# Patient Record
Sex: MALE | Race: ASIAN | NOT HISPANIC OR LATINO | URBAN - METROPOLITAN AREA
[De-identification: names, ages, dates, MRNs, and addresses within clinical notes are randomized per-mention and may not be internally consistent; named-entity substitution may affect disease eponyms.]

---

## 2024-01-29 ENCOUNTER — EMERGENCY (EMERGENCY)
Facility: HOSPITAL | Age: 24
LOS: 1 days | Discharge: HOME | End: 2024-01-29
Attending: STUDENT IN AN ORGANIZED HEALTH CARE EDUCATION/TRAINING PROGRAM | Admitting: STUDENT IN AN ORGANIZED HEALTH CARE EDUCATION/TRAINING PROGRAM
Payer: MEDICAID

## 2024-01-29 VITALS
DIASTOLIC BLOOD PRESSURE: 91 MMHG | OXYGEN SATURATION: 99 % | SYSTOLIC BLOOD PRESSURE: 140 MMHG | RESPIRATION RATE: 16 BRPM | TEMPERATURE: 98 F | HEART RATE: 72 BPM

## 2024-01-29 PROCEDURE — 99284 EMERGENCY DEPT VISIT MOD MDM: CPT

## 2024-01-29 RX ORDER — IBUPROFEN 200 MG
400 TABLET ORAL ONCE
Refills: 0 | Status: DISCONTINUED | OUTPATIENT
Start: 2024-01-29 | End: 2024-01-29

## 2024-01-29 RX ORDER — IBUPROFEN 200 MG
600 TABLET ORAL ONCE
Refills: 0 | Status: COMPLETED | OUTPATIENT
Start: 2024-01-29 | End: 2024-01-29

## 2024-01-29 RX ORDER — LIDOCAINE 4 G/100G
1 CREAM TOPICAL ONCE
Refills: 0 | Status: COMPLETED | OUTPATIENT
Start: 2024-01-29 | End: 2024-01-29

## 2024-01-29 RX ORDER — ACETAMINOPHEN 500 MG
975 TABLET ORAL ONCE
Refills: 0 | Status: COMPLETED | OUTPATIENT
Start: 2024-01-29 | End: 2024-01-29

## 2024-01-29 RX ADMIN — Medication 975 MILLIGRAM(S): at 09:22

## 2024-01-29 RX ADMIN — Medication 600 MILLIGRAM(S): at 09:22

## 2024-01-29 RX ADMIN — LIDOCAINE 1 PATCH: 4 CREAM TOPICAL at 09:22

## 2024-01-29 NOTE — ED PROVIDER NOTE - PHYSICAL EXAMINATION
VITALS: reviewed  GEN: NAD, A & O x 4  HEAD/EYES: NCAT, EOMI, anicteric sclerae,   ENT: mucus membranes moist, oropharynx WNL, trachea midline,   RESP: unlabored breathing  CV: distal pulses intact and symmetric bilaterally  MSK: extremities atraumatic. Swelling and tenderness to R 1st MTP joint. No subcutaneous gas, no tracking.   SKIN: warm, dry, no rash, no bruising, no cyanosis. color appropriate for ethnicity  NEURO: alert, mentating appropriately, no facial asymmetry.  PSYCH: Affect appropriate

## 2024-01-29 NOTE — ED ADULT TRIAGE NOTE - CHIEF COMPLAINT QUOTE
Pt arrives ambulatory to triage c/o pain & swelling to right LE x3-4 wks. Denies known injury or numbness/tingling. Denies other PMHx.

## 2024-01-29 NOTE — ED PROVIDER NOTE - PATIENT PORTAL LINK FT
You can access the FollowMyHealth Patient Portal offered by [facility] by registering at the following website: [email or web address]. By joining [employer]’s FollowMyHealth portal, you will also be able to view your health information using other applications (apps) compatible with our system.

## 2024-01-29 NOTE — ED ADULT NURSE NOTE - OBJECTIVE STATEMENT
Pt received to wellness center, awake and alert, A&OX4, ambulatory. C/o R great toe pain x3 weeks, worse to outer toe. Denies injury or trauma. Appears swollen. Respirations even and unlabored. Denies CP, SOB, N/V, HA, dizziness, palpitations, blurry vision, fevers/chills. Bed in lowest position, call bell within reach. Safety maintained.

## 2024-01-29 NOTE — ED PROVIDER NOTE - NSFOLLOWUPINSTRUCTIONS_ED_ALL_ED_FT
Take Tylenol 650mg (Two 325 mg pills) every 4-6 hours as needed for pain.  Take Motrin 400 mg every 6 hours as needed for moderate pain -- take with food.  A lidocaine patch was placed, remove after 12 hours. Do not use for more than 12 hours in 24 hour period. You may purchase more over the counter at your local pharmacy.    Gout  A foot with gout, showing uric acid crystals in a joint, along with an image that shows swelling on the outside of the foot.  Gout is painful swelling of your joints. Gout is a type of arthritis. It is caused by having too much uric acid in your body. Uric acid is a chemical that is made when your body breaks down substances called purines. If your body has too much uric acid, sharp crystals can form and build up in your joints. This causes pain and swelling.    Gout attacks can happen quickly and be very painful (acute gout). Over time, the attacks can affect more joints and happen more often (chronic gout).    What are the causes?  Gout is caused by too much uric acid in your blood. This can happen because:  Your kidneys do not remove enough uric acid from your blood.  Your body makes too much uric acid.  You eat too many foods that are high in purines. These foods include organ meats, some seafood, and beer.  Trauma or stress can bring on an attack.    What increases the risk?  Having a family history of gout.  Being male and middle-aged.  Being female and having gone through menopause.  Having an organ transplant.  Taking certain medicines.  Having certain conditions, such as:  Being very overweight (obese).  Lead poisoning.  Kidney disease.  A skin condition called psoriasis.  Other risks include:  Losing weight too quickly.  Not having enough water in the body (being dehydrated).  Drinking alcohol, especially beer.  Drinking beverages that are sweetened with a type of sugar called fructose.  What are the signs or symptoms?  A foot and ankle with swelling and shiny, purple skin caused by gout.   An attack of acute gout often starts at night and usually happens in just one joint. The most common place is the big toe. Other joints that may be affected include joints of the feet, ankle, knee, fingers, wrist, or elbow. Symptoms may include:  Very bad pain.  Warmth.  Swelling.  Stiffness.  Tenderness. The affected joint may be very painful to touch.  Shiny, red, or purple skin.  Chills and fever.  Chronic gout may cause symptoms more often. More joints may be involved. You may also have white or yellow lumps (tophi) on your hands or feet or in other areas near your joints.    How is this treated?  Treatment for an acute attack may include medicines for pain and swelling, such as:  NSAIDs, such as ibuprofen.  Steroids taken by mouth or injected into a joint.  Colchicine. This can be given by mouth or through an IV tube.  Treatment to prevent future attacks may include:  Taking small doses of NSAIDs or colchicine daily.  Using a medicine that reduces uric acid levels in your blood, such as allopurinol.  Making changes to your diet. You may need to see a food expert (dietitian) about what to eat and drink to prevent gout.  Follow these instructions at home:  During a gout attack    Bag of ice on a towel on the skin.   If told, put ice on the painful area. To do this:  Put ice in a plastic bag.  Place a towel between your skin and the bag.  Leave the ice on for 20 minutes, 2–3 times a day.  Take off the ice if your skin turns bright red. This is very important. If you cannot feel pain, heat, or cold, you have a greater risk of damage to the area.  Raise the painful joint above the level of your heart as often as you can.  Rest the joint as much as possible. If the joint is in your leg, you may be given crutches.  Follow instructions from your doctor about what you cannot eat or drink.  Avoiding future gout attacks    Eat a low-purine diet. Avoid foods and drinks such as:  Liver.  Kidney.  Anchovies.  Asparagus.  Herring.  Mushrooms.  Mussels.  Beer.  Stay at a healthy weight. If you want to lose weight, talk with your doctor. Do not lose weight too fast.  Start or continue an exercise plan as told by your doctor.  Eating and drinking    Avoid drinks sweetened by fructose.  Drink enough fluids to keep your pee (urine) pale yellow.  If you drink alcohol:  Limit how much you have to:  0–1 drink a day for women who are not pregnant.  0–2 drinks a day for men.  Know how much alcohol is in a drink. In the [place] one drink equals one 12 oz bottle of beer (355 mL), one 5 oz glass of wine (148 mL), or one 1½ oz glass of hard liquor (44 mL).  General instructions    Take over-the-counter and prescription medicines only as told by your doctor.  Ask your doctor if you should avoid driving or using machines while you are taking your medicine.  Return to your normal activities when your doctor says that it is safe.  Keep all follow-up visits.  Where to find more information  [employer]: www.niams.nih.gov  Contact a doctor if:  You have another gout attack.  You still have symptoms of a gout attack after 10 days of treatment.  You have problems (side effects) because of your medicines.  You have chills or a fever.  You have burning pain when you pee (urinate).  You have pain in your lower back or belly.  Get help right away if:  You have very bad pain.  Your pain cannot be controlled.  You cannot pee.  Summary  Gout is painful swelling of the joints.  The most common site of pain is the big toe, but it can affect other joints.  Medicines and avoiding some foods can help to prevent and treat gout attacks.  This information is not intended to replace advice given to you by your health care provider. Make sure you discuss any questions you have with your health care provider.

## 2024-01-29 NOTE — ED PROVIDER NOTE - CLINICAL SUMMARY MEDICAL DECISION MAKING FREE TEXT BOX
22 yo M, no reported pmhx, presenting with complaints of three weeks of R toe pain and swelling. Seen by urgent care and prescribed medication which helped his symptoms, but he states that he ran out of meds. He denies trauma/falls. States he had an xray which was normal. No fevers/chills. No calf/pain or swelling. Exam with swelling and tenderness overlying MTP joint, consistent with gout flare. Plan for meds and dc with recs for podiatry follow up and return recs for s/sx septic arthritis